# Patient Record
Sex: MALE | Race: WHITE
[De-identification: names, ages, dates, MRNs, and addresses within clinical notes are randomized per-mention and may not be internally consistent; named-entity substitution may affect disease eponyms.]

---

## 2019-06-10 ENCOUNTER — HOSPITAL ENCOUNTER (OUTPATIENT)
Dept: HOSPITAL 84 - D.CATH | Age: 79
Discharge: HOME | End: 2019-06-10
Attending: EMERGENCY MEDICINE
Payer: MEDICARE

## 2019-06-10 VITALS
HEIGHT: 68 IN | HEIGHT: 68 IN | BODY MASS INDEX: 30.38 KG/M2 | BODY MASS INDEX: 30.38 KG/M2 | WEIGHT: 200.42 LBS | WEIGHT: 200.42 LBS

## 2019-06-10 VITALS — DIASTOLIC BLOOD PRESSURE: 88 MMHG | SYSTOLIC BLOOD PRESSURE: 138 MMHG

## 2019-06-10 DIAGNOSIS — E78.5: ICD-10-CM

## 2019-06-10 DIAGNOSIS — I25.10: Primary | ICD-10-CM

## 2019-06-10 DIAGNOSIS — I10: ICD-10-CM

## 2019-06-10 DIAGNOSIS — E11.9: ICD-10-CM

## 2019-06-10 LAB
ALBUMIN SERPL-MCNC: 3.5 G/DL (ref 3.4–5)
ALP SERPL-CCNC: 61 U/L (ref 46–116)
ALT SERPL-CCNC: 38 U/L (ref 10–68)
ANION GAP SERPL CALC-SCNC: 9.4 MMOL/L (ref 8–16)
APTT BLD: 25.8 SECONDS (ref 22.8–39.4)
BASOPHILS NFR BLD AUTO: 0.6 % (ref 0–2)
BILIRUB SERPL-MCNC: 0.72 MG/DL (ref 0.2–1.3)
BUN SERPL-MCNC: 13 MG/DL (ref 7–18)
CALCIUM SERPL-MCNC: 8.9 MG/DL (ref 8.5–10.1)
CHLORIDE SERPL-SCNC: 104 MMOL/L (ref 98–107)
CK MB SERPL-MCNC: 0.5 U/L (ref 0–3.6)
CK SERPL-CCNC: 53 UL (ref 21–232)
CO2 SERPL-SCNC: 24.4 MMOL/L (ref 21–32)
CREAT SERPL-MCNC: 1 MG/DL (ref 0.6–1.3)
EOSINOPHIL NFR BLD: 3.4 % (ref 0–7)
ERYTHROCYTE [DISTWIDTH] IN BLOOD BY AUTOMATED COUNT: 12.8 % (ref 11.5–14.5)
GLOBULIN SER-MCNC: 3.6 G/L
GLUCOSE SERPL-MCNC: 168 MG/DL (ref 74–106)
HCT VFR BLD CALC: 43.2 % (ref 42–54)
HGB BLD-MCNC: 15.3 G/DL (ref 13.5–17.5)
IMM GRANULOCYTES NFR BLD: 0.2 % (ref 0–5)
INR PPP: 0.99 (ref 0.85–1.17)
LYMPHOCYTES NFR BLD AUTO: 29.7 % (ref 15–50)
MAGNESIUM SERPL-MCNC: 1.7 MG/DL (ref 1.8–2.4)
MCH RBC QN AUTO: 30.5 PG (ref 26–34)
MCHC RBC AUTO-ENTMCNC: 35.4 G/DL (ref 31–37)
MCV RBC: 86.1 FL (ref 80–100)
MONOCYTES NFR BLD: 12.8 % (ref 2–11)
NEUTROPHILS NFR BLD AUTO: 53.3 % (ref 40–80)
NT-PROBNP SERPL-MCNC: 85 PG/ML (ref 0–450)
OSMOLALITY SERPL CALC.SUM OF ELEC: 271 MOSM/KG (ref 275–300)
PLATELET # BLD: 204 10X3/UL (ref 130–400)
PMV BLD AUTO: 10 FL (ref 7.4–10.4)
POTASSIUM SERPL-SCNC: 3.8 MMOL/L (ref 3.5–5.1)
PROT SERPL-MCNC: 7.1 G/DL (ref 6.4–8.2)
PROTHROMBIN TIME: 12.6 SECONDS (ref 11.6–15)
RBC # BLD AUTO: 5.02 10X6/UL (ref 4.2–6.1)
SODIUM SERPL-SCNC: 134 MMOL/L (ref 136–145)
TROPONIN I SERPL-MCNC: < 0.017 NG/ML (ref 0–0.06)
WBC # BLD AUTO: 6.2 10X3/UL (ref 4.8–10.8)

## 2019-06-10 NOTE — NUR
DRESSING CDI, PEDAL PULSES PALPABLE. PT
DESIREE PO FLUIDS WITH NO NAUSEA. STATES WANTS TO TAKE
SANDWICH HOME. NSR, RATE IS 60, BP /73. PT IS ALERT, WATCHING
BASEBALL GAME AND VISITING WITH HIS FAMILY.

## 2019-06-10 NOTE — NUR
PT AWAKE, REQUESTS URINAL AND THIS PLACED FOR PT. DRESSING REMAINS
CDI TO RIGHT GROIN, AREA IS SOFT AND NONTENDER. PEDAL PULSES
PALPABLE. SINUS FRITZ AT 57, BP /55. PT'S FAMILY HAS BRIEFLY
VISITED PT AND HAVE LEFT AGAIN. CALL LIGHT IN REACH. PT DENIES ANY
C/O AT THIS TIME.

## 2019-06-10 NOTE — HEMODYNAMI
PATIENT:THOM SIMMONS                             MEDICAL RECORD: [id number]
: 40                                            [facility]           
ACCT# [id number]                                       ADMISSION DATE: 06/10/19
 
 
 Generatedon:6/10/593932:01
Patient name: THOM SIMMONS Patient #: [id number] Visit #: [id number] SSN: 
: 1940 Date of
study: 6/10/2019
Page: Of
Hemodynamic Procedure Report
****************************
Patient Data
Patient Demographics
Procedure consent was obtained
First Name: THOM          Gender: Male
Last Name: IRIS           : 1940
Middle Initial: CARLOTTA      Age: 78 year(s)
Patient #: [id number]       Race: Unknown
Visit #: [id number]
Accession #:
[id number]
Additional ID: [id number]
Contact details
Address: [address]  Phone: [phone]
State: [place]
City: [place]
Zip code: [address]
Past Medical History
Allergies
Allergen        Reaction        Date         Comments
Reported
Other allergy                   2015    Morphine,
Hydrocodone
Admission
Admission Data
Admission Date: 6/10/2019   Admission Time: 9:41
Procedure
Procedure Types
Cath Procedure
Diagnostic Procedure
LHC
LH w/Coronaries
Sedation Charges
Moderate Sedation up to 15 minutes
PCI Procedure
Coronary Stent
Coronary Stent Initial
Procedure Description
Procedure Date
Procedure Date: 6/10/2019
Procedure Start Time: 11:35
Procedure End Time: 11:50
Procedure Staff
Name                            Function
Jessica Cruz RT                    Monitor
Janes Boucher MD              Performing Physician
Cathie García RT               Scrub
Diane Curry RN                Nurse
 
Procedure Data
Cath Procedure
Fluoroscopy
Diagnostic fluoroscopy      Total fluoroscopy Time: 2.3
time: 2.3 min               min
Diagnostic fluoroscopy      Total fluoroscopy dose: 584
dose: 584 mGy               mGy
Contrast Material
Contrast Material Type                       Amount (ml)
Isovue 370                                   62
Entry Location
Entry     Primary  Successful  Side  Size  Upsize Upsize Entry    Closure Succes
sful  Closure
Location                             (Fr)  1 (Fr) 2 (Fr) Remarks  Device        
      Remarks
Femoral                        Right 5 Fr  6 Fr                   Exoseal
artery                                     Short
Estimated blood loss: 5 ml
Diagnostic catheters
Device Type               Used For           End Catheter
Placement
MULTIPACK JL 4.0 5Fr      Left Coronary
catheter                  Angiography
MULTIPACK 3DRC 5Fr        Right Coronary
catheter                  Angiography
MULTIPACK Pigtail 5 Fr    LV Angiography
catheter
Procedure Complications
No complications
Procedure Medications
Medication           Administration Route Dosage
0.9% NaCl            I.V.                 100 ml/hr
Oxygen               etCO2 Nasal cannula  2 l/min
Lidocaine 2%         added to field       20
Heparin Flush Bag    added to field       2 bags
(1000units/500ml NS)
Versed               I.V.                 2 mg
Fentanyl             I.V.                 50 mcg
Heparin Bolus        I.V.                 5000 units
Integrilin (Bolus    I.V.                 8.5 ml
2mg/ml)
Fentanyl             I.V.                 50 mcg
Hemodynamics
Rest
Heart Rate: 63 (bpm)
Pressure Samples
Time     Site     Value (mmHg) Purpose      Heart      Use
Rate(bpm)
11:39    LV       104/6,3      Snapshot     78
Gradients
Valve  Time  Site Site Mean    SEP/DFP    Peak To Heart  Use
1    2    (mmHg)  (sec/min)  Peak    Rate
(mmHg)  (bpm)
Aortic 11:40 LV   AO                              68
Snapshots
Pre Cath      Intra         NCS           Post Cath
Vital Signs
Time     Heart  Resp   SPO2 etCO2   NIBP (mmHg) Rhythm  Pain Status Sedation
Rate   (ipm)  (%)  (mmHg)                                  Level
(bpm)
 
11:29:13 62     21     99   30.9    146/81(111) NSR     0 (11) , No 10(A)
pain
11:33:27 62     11     97   18.1    125/61(81)  NSR     0 (11) , No 9(A)
pain
11:37:39 66     12     98   36.9    129/76(91)  NSR     0 (11) , No 9(A)
pain
11:41:55 66     12     98   34.7    137/71(99)  NSR     4 (11) ,    10(A)
Distressing
11:46:07 73     12     98   36.2    118/70(84)  NSR     0 (11) , No 10(A)
pain
Medications
Time     Medication       Route   Dose  Verified Delivered Reason          Notes
    Effectiveness
by       by
11:27:01 0.9% NaCl        I.V.    100   Janes Contreras     used for
ml/hr Citlaly  Patricio   procedure
MD       RN
11:27:08 Oxygen           etCO2   2     Janes Contreras     used for
Nasal   l/min Citlaly  Patricio   procedure
cannula       MD       RN
11:27:16 Lidocaine 2%     added   20ml  Janes Marrero   for local
to      vial  Citlaly  Citlaly   anesthetic
field         MD       MD
11:27:20 Heparin Flush    added   2     Janes Marrero   used for
Bag              to      bags  Citlaly  Citlaly   procedure
(1000units/500ml field         MD       MD
NS)
11:32:47 Versed           I.V.    2 mg  Janes Contreras     for sedation
[facility] Conner Curry MD       RN
11:32:53 Fentanyl         I.V.    50    Janes  Diane     for sedation
mcg   [facility] Conner Curry MD       RN
11:42:35 Heparin Bolus    I.V.    5000  Janes Rosalesyla     for             verif
ied
units [facility] Conner Curry   anticoagulation with Dr. MD CORTEZ                        Mauricio
11:42:50 Integrilin       I.V.    8.5   Janes Contreras     for             waste
d
(Bolus 2mg/ml)           ml    [facility] Conner Curry   antiplatelet    1.5mL
MD       RN        therapy
11:44:26 Fentanyl         I.V.    50    Janes Cavazosa     for sedation
mcg   St Conner Curry MD       RN
Procedure Log
Time     Note
11:03:17 Diagnostic Cath Status : Elective
11:03:33 Jessica Cruz RT(R) sent for patient. Start room use.
11:03:34 Time tracking: Regular hours (M-F 7:00 - 5:00)
11:03:39 Plan of Care:Hemodynamics will remain stable., Cardiac
rhythm will remain stable., Comfort level will be
maintained., Respiratory function will remain
adequate., Patient/ family verbilizes understanding of
procedure., Procedure tolerated without complication.,
Recovers from procedure without complications..
11:26:53 Vital chart was started
11:27:01 0.9% NaCl 100 ml/hr I.V. was administered by Diane Curry RN; used for procedure;
11:27:08 Oxygen 2 l/min etCO2 Nasal cannula was administered by
Diane Curry RN; used for procedure;
 
11:27:16 Lidocaine 2% 20ml vial added to field was administered
by Janes Boucher MD; for local anesthetic;
11:27:20 Heparin Flush Bag (1000units/500ml NS) 2 bags added to
field was administered by Janes Boucher MD; used for
procedure;
11:30:16 Patient received from ED to CCL 1 Alert and oriented.
Tansferred to table in Supine position.
11:30:17 Warm blankets applied, and yareli hugger turned on for
patient comfort.
11:30:18 Correct patient and procedure confirmed by team.
11:30:19 Signed procedure consent form obtained from patient.
11:30:20 ECG and BP/O2 sat monitors applied to patient.
11:30:21 Baseline sample Acquired.
11:30:26 Rhythm: sinus rhythm
11:30:27 Full Disclosure recording started
11:30:31 H&P Date Dictated: 6/10/2019 New H&P dictated by
physician..
11:30:34 Pre-op teaching completed and patient verbalized
understanding.
11:30:34 Pre-procedure instructions explained to patient.
11:30:36 Family in waiting room.
11:30:39 Patient NPO since Midnight.
11:30:40 Is the patient allergic to Iodine/contrast media? No.
11:30:41 Was the patient premedicated? No
11:30:42 Is patient on blood thinner?Yes
11:30:46 **ACC** The patient was administered the following
blood thiners within the last 24 hours:
**ACC**Aspirin, **ACC**Plavix
11:30:48 Patient diabetic? Yes.
11:30:49 If diabetic: On Metformin? Yes
11:30:54 If on Metformin: Last Dose? 2019
11:30:57 Previous problem with sedation/anesthesia? No ?
11:30:59 Snore? Yes
11:31:00 Sleep apnea? No
11:31:01 Deviated septum? No
11:31:02 Opens mouth fully? Yes
11:31:03 Sticks out tongue? Yes
11:31:04 Airway obstruction? No ?
11:31:09 Dentures? Yes in tight
11:31:16 Pre procedure: right dorsailis pedis pulse 2+ Normal;
easily identifiable; not easily obliterated
11:31:18 Pre procedure: left dorsailis pedis pulse 2+ Normal;
easily identifiable; not easily obliterated
11:31:20 Patient pain scale 0/10 ?.
11:31:25 IV patent on arrival in left forearm with 0.9% NaCl at
[facility].
11:31:28 Lab results completed and on chart.
11:31:32 Alarms reviewed by R. N.
11:31:32 Right groin area was prepped with chlora-prep and
draped in sterile fashion
11:31:33 Sharps counted by scrub and verified by R.N.
11:31:44 --------ALL STOP TIME OUT------
11:31:44 Physician arrived
11:31:45 Final Timeout: patient, procedure, and site verified
with staff and physician. All members of the team are
in agreement.
11:31:47 Right groin site verified by team.
11:31:50 Maximum allowable Isovue 370 dose 300ml. Physician
notified. (300ml for normal creatinines. For patients
with creatinine of 1.7 or higher multiply weight(kg) x
 
5 divided by creatinine.)
11:31:54 Fire Safety Assessment: A--An alcohol-based skin
anteseptic being used preoperatively., C--Open oxygen
or nitrous oxide is being used., D--An ESU, laser, or
fiber-optic light is being used.
11:32:01 Physical assessment completed. ASA score P 1 - A
normal healthy patient as per Janes Boucher MD.
11:32:05 Sedation plan: IV Moderate Sedation Medication:Versed,
Fentanyl
11:32:47 Versed 2 mg I.V. was administered by Diane Curry RN;
for sedation;
11:32:53 Fentanyl 50 mcg I.V. was administered by Diane Curry
RN; for sedation;
11:35:04 Procedure started.
11:35:07 Local anesthetic to right femoral artery with
Lidocaine 2% by Janes Boucher MD.**INITIAL ACCESS
ONLY**
11:35:24 Use device set Femoral Dx
11:35:25 ACIST Syringe (96389) opened to sterile field.
11:35:26 DIAGNOSTIC WIRE .035 260cm J wire (624537) opened to
sterile field.
11:35:26 Medline Cath Pack ([id number]) opened to sterile field.
11:35:26 Bag Decanter (2002S) opened to sterile field.
11:35:28 ACIST Manifold (84010) opened to sterile field.
11:35:29 ACIST Hand Control (13623) opened to sterile field.
11:35:30 Tegaderm 4 x 4 (1626W) opened to sterile field.
11:35:30 DIAGNOSTIC Multipack 5Fr catheter set ([id number]) opened
to sterile field.
11:35:31 SHEATH 5FR [place] (CWU943) opened to sterile field.
11:37:48 A 5 Fr sheath was inserted into the Right Femoral
artery
11:37:58 A MULTIPACK JL 4.0 5Fr catheter was advanced over the
wire and used for Left Coronary Angiography.
11:38:07 LCA angiography performed.
11:38:09 Injector settings: Ml/sec: 3, Volume: 6,
11:38:14 Catheter removed.
11:38:21 A MULTIPACK 3DRC 5Fr catheter was advanced over the
wire and used for Right Coronary Angiography.
11:38:29 RCA angiography performed.
11:38:32 Injector settings: Ml/sec: 3, Volume: 6,
11:38:53 Catheter removed.
11:39:06 A MULTIPACK Pigtail 5 Fr catheter was advanced over
the wire and used for LV Angiography.
11:39:41 WHISPER 300cm guide wire ([id number]) opened to sterile
field.
11:39:41 GUIDE 6FR XBLAD 3.5 catheter ([id number]) opened to
sterile field.
11:39:42 INFLATOR Merit BasixCompak (CO8191) opened to sterile
field.
11:39:43 SHEATH 6FR [place] (LSU340) opened to sterile field.
11:39:50 LV hemodynamics recorded.
11:39:51 LV gram done using ESCALONA
11:39:53 Injector settings: Ml/sec: 5, Volume: 15,
11:40:03 EF : 55 %
11:40:06 Catheter removed.
11:40:07 Proceeding to intervention.
11:40:13 Sheath upsized to a 6 Fr Short.
11:40:20 6 Fr xblad 3.5 guide catheter was inserted over the
wire
11:40:41 whisper wire advanced.
 
11:42:16 Wire advanced across lesion.
11:42:35 Heparin Bolus 5000 units I.V. was administered by
Diane Curry RN; for anticoagulation; verified with
Dr. Martínez
11:42:50 Integrilin (Bolus 2mg/ml) 8.5 ml I.V. was administered
by Diane Curry RN; for antiplatelet therapy; wasted
1.5mL
11:44:26 Fentanyl 50 mcg I.V. was administered by Diane Curry
RN; for sedation;
11:45:51 Place stent Inflation Number: 1 A JAYSON OTW 2.5 x 08
stent ([id number]) was prepped and advanced across
the Mid LAD 90. The stent was deployed at 14 [place] for
0:30 (min:sec) -1.
11:47:12 Stent catheter was removed intact over wire.
11:47:14 Wire removed.
11:47:15 Guide catheter removed.
11:47:24 Sheath removed intact; hemostasis achieved with
Exoseal to the Right Femoral artery.
11:47:27 Procedure ended.(Physican Out)
11:47:48 Fluoroscopy time 02.30 minutes.
11:47:55 Fluoroscopy dose: 584 mGy
11:47:55 Flurop Dose total: 584
11:48:02 Contrast amount:Isovue 370 62ml.
11:48:03 Sharps counted by scrub and verified by R.N.
11:48:14 Insertion/operative site no bleeding no hematoma.
11:48:16 Post-op/insertion site Right Femoral artery dressed
using a 4 x 4 and Tegaderm.
11:48:19 Post right femoral artery:stable
11:48:21 Post Procedure Pulses reassessed and unchanged
11:48:24 Post procedure rhythm: unchanged.
11:48:27 Estimated blood loss: 5 ml
11:48:28 Post procedure instruction explained to
patient.Patient verbalizes understanding.
11:48:29 Patient needs reinforcement of post procedure
teaching.
11:48:46 Procedure type changed to Cath procedure, Diagnostic
procedure, LHC, LHC w/Coronaries, Sedation Charges,
Moderate Sedation up to 15 minutes, PCI procedure,
Coronary Stent, Coronary Stent Initial
11:48:48 Procedure and supply charges have been captured,
reviewed, submitted and are correct.
11:48:53 Procedure Complication : No complications
11:48:55 Vital chart was stopped
11:48:56 See physician's report for complete and final results.
11:50:19 Report given to Pre/Post Procedure Room.
11:50:22 Patient transfered to Pre/Post Procedure Room with
Stretcher.
11:50:33 Full Disclosure recording stopped
11:50:33 Procedure ended.
11:50:43 **ACC-PCI Only** Patient was given prescriptions, or
instructed by Janes Boucher MD to start/continue the
following medications upon discharge: Plavix
11:50:45 End room use (Document Last)
11:53:41 EXOSEAL 6Fr () opened to sterile field.
Intervention Summary
Intervention Notes
Time     ActionType  Lesion and  Equipment     Action#  Pressure  Duration
Attributes  Used
11:45:51 Place stent Mid LAD     JAYSON OTW 2.5  1        14        00:30
x 08 stent
 
([id number])
Device Usage
Item Name     Manufacture  Quantity  Catalog      Hospital Part    Current Minim
al Lot# /
Number       Charge   Number  Stock   Stock   Serial#
Code
ACIST Syringe Acist        1         55768        435208   164581  465284  20
(96601)       Medical
Systems Inc
Bag Decanter  Microtek     1                 997953   40608   766733  5
()       Medical Inc.
Medline Cath  Medline      1         [id number]    786853   11495   251765  5
Pack
([id number])
DIAGNOSTIC    St Abdulaziz      1         198905       172999   096118  809578  30
WIRE .035
260cm J wire
(832307)
ACIST         Acist        1         08871        222905   569304  276212  5
Manifold      Medical
(97129)       Systems Inc
ACIST Hand    Acist        1         50072        951830   255589  615140  5
Control       Medical
(69956)       Systems Inc
DIAGNOSTIC    Cardinal     1         VI3745       833163   75108   223300  30
Multipack 5Fr Health
catheter set
(PM8546)
Tegaderm 4 x  3M           1         1626W        550061   683028  120571  5
4 (1626W)
SHEATH 5FR    Terumo       1         ATU503       472748   639975  766752  5
[place]
(NFM424)
MULTIPACK JL  Cardinal     1                                       272918  5
4.0 5Fr       Health
catheter
MULTIPACK     Cardinal     1                                       806589  5
3DRC 5Fr      Health
catheter
MULTIPACK     Cardinal     1                                       351906  5
Pigtail 5 Fr  Health
catheter
GUIDE 6FR     Cardinal     1         [id number]     500037   362806  730004  10
XBLAD 3.5     Health
catheter
([id number])
WHISPER 300cm Abbott       1         [id number]    994675   389134  563635  5
guide wire    Vascular
([id number])
INFLATOR      Merit        1         VA0012       786515   233051  291512  15
[facility]         Medical
BasixCompak
(RN9507)
SHEATH 6FR    Terumo       1         SFI369       019550   108616  507359  40
[place]
(FEF888)
JAYSON OTW 2.5  Medtronic    1         [id number]  636620   48159   701479  5    
   [id number]
x 08 stent
([id number])
 
EXOSEAL 6Fr   Cardinal     1                 024632   311615  596064  10
()       Health
Signature [place]
Stage           Time        Signature      Unsigned
Intra-Procedure 6/10/2019   Jessica Cruz RT(R)
11:54:07 AM RT(R)          6/10/2019 12:00:51
PM
Intra-Procedure 6/10/2019   Jessica Cruz
12:01:21 PM RT(R)
Signatures
Monitor : Jessica Cruz RT   Signature :
______________________________
Date : ______________ Time :
______________
 
 
 
 
 
 
 
 
 
 
 
 
 
 
 
 
 
 
 
 
 
 
 
 
 
 
 
 
 
 
 
 
 
 
 
 
 
 
 
 
 
[place] [id number] [facility] [place] [address]

## 2019-06-10 NOTE — NUR
1405 PT HAS VOIDED 500 CC CLEAR YELLOW URINE TO URINAL. DENIES ANY
C/O. OFFERED PO FLUIDS AND PT STILL REFUSES, STATES WANTS TO WAIT
UNTIL HE CAN SIT UP. HOB IS FLAT, DRESSING CDI TO RIGHT GROIN, AREA
IS SOFT AND NONTENDER. PEDAL PULSES PALPABLE. SINUS FRITZ AT 57,
DENIES ANY C/O CHEST PAIN. PT SLEEPING INTERMITTENTLY, AWAKENS
EASILY. STATES WANTS TO BE AWAKENED IF HE IS SLEEPING AT 3 PM TO
WATCH A BASEBALL GAME, NO FAMILY AT BEDSIDE, CALL LIGHT IN REACH.

## 2019-06-10 NOTE — NUR
PT SLEEPING, AWAKENS EASILY. DENIES ANY C/O. DRESSING IS CDI TO RIGHT
GROIN, AREA IS SOFT AND NONTENDER. PEDAL PULSES PALPABLE. NSR, RATE
60, BP IS 93/57. HOB IS FLAT, CALL LIGHT IN REACH.

## 2019-06-10 NOTE — NUR
1545 DRESSING REMAINS CDI TO RIGHT GROIN, AREA IS SOFT AND
NONTENDER. PEDAL PULSES PALPABLE. PT IS ALERT AND DENIES ANY C/O.
VSS. IV DC'D WITH CATH INTACT. DC INSTRUCTIONS REVIEWED WITH PT, WIFE
AND DAUGHTER WHO VERBALIZE UNDERSTANDING. PLAVIX PRESCRIPTION CALLED
TO [facility] PER PT REQUEST.
1600 PT HAS DRESSED FOR DC WITH ASSIST. VOIDED ADDITIONAL 450 CC
CLEAR YELLOW URINE TO URINAL. PT ESCORTED TO PRIVATE AUTO VIA WC BY
NURSE WITH WIFE DRIVING HIM HOME. PT HAS ALL DC INSTRUCTIONS AND
PERSONAL BELONGINGS AT [place].

## 2019-06-10 NOTE — NUR
PT DENIES ANY C/O. DRESSING IS CDI TO RIGHT GROIN, AREA IS SOFT AND
NONTENDER. HOB FLAT, PEDAL PULSES PALPABLE. NSR, DENEIS ANY C/O CHEST
PAIN. CALL LIGHT IN REACH.

## 2019-06-10 NOTE — NUR
PT AWAKENS WASILY, DENIES NEEDS OR C/O AT THIS ITME. DRESSING IS CDI,
PEDAL PULSES PALPABLE. HOB IS FLAT.

## 2019-06-10 NOTE — NUR
RECEIVED PT FROM CATH LAB, PT SLEEPY, AWAKENS EASILY TO VERBAL
STIMULI. DENIES ANY C/O CHEST PAIN OR NAUSEA. DRESSING TO RIGHT GROIN
IS CDI, AREA IS SOFT AND NONTENDER. PEDAL PULSES PALPABLE. HOB IS
FLAT, PT INSTRUCTED TO KEEP HEAD FLAT TO PILLOW AND RIGHT LEG
STRAIGHT AND VERBALIZES UNDERSTANDING. DR PEREZ HAS ROUNDED AND
SPOKEN WITH FAMILY. FAMILY HAS LEFT TO GO TO [place]. CALL LIGHT IN
REACH.

## 2019-06-10 NOTE — NUR
DRESSING TO RIGHT GROIN IS CDI, AREA IS SOFT AND NONTENDER. HOB
ELEVATED 30 DEGREES, PT IS ALERT AND DENIES ANY C/O. SANDWICH AND PO
FLUIDS AT BEDSIDE. PEDAL PULSES PALPABLE. NSR, RATE 60. CALL LIGHT IN
REACH, NO FAMILY AT BEDSIDE.

## 2019-06-11 NOTE — OP
PATIENT NAME:  THOM SIMMONS                      MEDICAL RECORD: [id number]
:40                                             LOCATION:D.CAT          
                                                         ADMISSION DATE:        
SURGEON:  BIBIANA PEREZ MD          
 
 
DATE OF OPERATION:  06/10/2019
 
PROCEDURES:  Left heart catheterization, selective coronary angiography, right
femoral artery approach.
 
CATHETERS:  A 5-[place] sheath, 5/4 left and right Genie.
 
The procedure was well tolerated.  The patient was returned to diaz.  Sheath was
removed.  ExoSeal device was placed.
 
FINDINGS:  Left ventriculography in 30-degree ESCALONA view:  Normal wall motion and
normal systolic function.
 
CORONARY ANATOMY:
LEFT MAIN:  Left main is free of disease.
LAD:  LAD right at the takeoff of the first diagonal has an ostial stenosis of
90%.
CIRCUMFLEX:  Small vessel, free of disease.
RIGHT CORONARY ARTERY:  Large, dominant, free of disease.
 
IMPRESSION:  Unstable angina secondary to LAD disease.
 
PLAN:  Intervention momentarily.
 
DESCRIPTION OF PROCEDURE:  A 5-[place] sheath was exchanged for a 6-[place]rugu[place]
sheath.  XB LAD guiding catheter provided excellent guide catheter support
followed by 300 cm Whisper wire, which was placed across the tightly occluded
LAD down this portion of vessel.  Stent deployed was 2.5 x 12 Bryan stent up to
14 atmospheres.  Final angiography shows excellent resolution of 90% stenosis. 
There was no significant snowplowing to the diagonal.  Sheath was closed with
ExoSeal device.  Plavix was loaded previously in the ER.
 
TRANSINT:[id number] Voice Confirmation ID: [id number] DOCUMENT ID: [id number]
                                           
                                           BIBIANA PEREZ MD          
 
 
 
Electronically Signed by BIBIANA PEREZ on 19 at 1537
 
 
 
 
 
CC:                                                             [id number]
DICTATION DATE: 06/10/19 1155     :     06/10/19 1424      DEP CLI 
                                                                      06/10/19
[place] [id number] [place] [phone]

## 2019-06-11 NOTE — CN
PATIENT NAME:THOM SIMMONS                          MEDICAL RECORD: [id number]
: 40                                              LOCATION:[facility]      
ADMIT DATE:                                                ACCOUNT: [id number]
CONSULTING PHYSICIAN:    BIBIANA PEREZ MD          
                                               
REFERRING PHYSICIAN:     VALERIE VARGHESE MD            
 
 
DATE OF CONSULTATION:  06/10/2019
 
HISTORY OF PRESENT ILLNESS:  A 78-year-old gentleman with a history of diabetes
mellitus as well as dyslipidemia who presented to the ER with chest tightness
and pressure radiating to the jaw.  This began approximately 2 weeks ago, first
noticed when using a chainsaw, had rest symptomology this morning, prompting ER
visit.  No known history of coronary artery disease, is hypertensive.
 
PAST MEDICAL HISTORY:  Includes;
1.  History of hypertension.
2.  Hyperlipidemia.
3.  Diabetes mellitus.
 
ALLERGIES:  MORPHINE, HYDROCODONE.
 
MEDICATIONS:  Typically include Flomax 0.4 every day, aspirin 81 every day,
Restoril 15 at bedtime p.r.n., Zantac 300 daily, metformin half a gram b.i.d.
 
SOCIAL HISTORY:  Nonsmoker, nondrinker.  Usually takes care of all his ADLs,
stays quite active working outside.
 
REVIEW OF SYSTEMS:  The patient reports easy bruising but reports no swollen
glands. The patient reports no fever, no night sweats, no significant weight
gain, no significant weight loss.  No significant exercise tolerance.  The
patient reports no dry eyes, no irritation, no vision change.  Patient reports
no difficulty hearing and no ear pain.  Patient reports no frequent nose bleeds
or nose and sinus problems.  Patient reports on arm pain on exertion.  No
shortness of breath while lying down.  No history of heart murmur.  Patient
reports no cough, no wheezing or coughing up blood.  Patient reports no
abdominal pain, no vomiting.  Normal appetite.  No diarrhea and not vomiting
blood.  No nausea and no constipation.  Patient reports no incontinence.  No
difficulty urinating.  No hematuria.  No increased frequency.  Patient reports
no muscle aches.  No weakness, no arthralgias, no back pain.  No swelling of the
extremities.  Patient reports no abnormal mole, no jaundice, no rashes.  Reports
no loss of consciousness.  No weakness and no numbness.  No seizures, dizziness,
or headaches.  The patient reports no depression, no sleep disturbance, feeling
safe in a relationship and no alcohol abuse.  Patient reports on fatigue. 
Reports no runny nose or sinus pressure.  No itching, no hives, and no frequent
sneezing.
 
PHYSICAL EXAMINATION:
GENERAL:  Pleasant gentleman, acute distress.
VITAL SIGNS:  Blood pressure 138/88, pulse 76 and regular.
HEENT:  Normocephalic, atraumatic.
NECK:  No JVD or bruit.
HEART:  Regular.
LUNGS:  Fields clear.
ABDOMEN:  Soft, nontender.
EXTREMITIES:  Pulse are well preserved, 2+.  There is no edema.
NEUROLOGIC:  Grossly intact.
 
 
 
CONSULT REPORT                                 [id number]    THOM SIMMONS        
 
 
 
DIAGNOSTIC DATA:  ECG shows nonspecific ST-T changes in the high lateral leads.
 
IMPRESSION:  Accelerated angina now with rest symptomatology at this point.  EKG
changes as described above.
 
PLAN:  For angiography, intervention based on the above.
 
TRANSINT:[id number] Voice Confirmation ID: [id number] DOCUMENT ID: [id number]
                                           
                                           BIBIANA PEREZ MD          
 
 
 
Electronically Signed by BIBIANA PEREZ on 19 at 1537
 
 
 
 
 
 
 
 
 
 
 
 
 
 
 
 
 
 
 
 
 
 
 
 
 
 
 
 
 
 
 
 
CC:                                                             [id number]
DICTATION DATE: 06/10/19 1153     :     06/10/19 1442      DEP CLI 
                                                                      06/10/19
[id number] [place] [address]